# Patient Record
Sex: MALE | Race: WHITE | Employment: UNEMPLOYED | URBAN - METROPOLITAN AREA
[De-identification: names, ages, dates, MRNs, and addresses within clinical notes are randomized per-mention and may not be internally consistent; named-entity substitution may affect disease eponyms.]

---
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
## 2023-11-26 NOTE — TELEPHONE ENCOUNTER
Pt's mom calls, asking if pt is up to date on immunizations. After review no vaccines due until 16-18 years old. Discussed optional HPV vaccine series.  
no

## 2025-07-23 ENCOUNTER — HOSPITAL ENCOUNTER (EMERGENCY)
Facility: CLINIC | Age: 20
Discharge: HOME | End: 2025-07-23
Attending: EMERGENCY MEDICINE
Payer: COMMERCIAL

## 2025-07-23 VITALS
RESPIRATION RATE: 16 BRPM | WEIGHT: 178.2 LBS | SYSTOLIC BLOOD PRESSURE: 151 MMHG | HEIGHT: 70 IN | HEART RATE: 57 BPM | BODY MASS INDEX: 25.51 KG/M2 | TEMPERATURE: 98.2 F | DIASTOLIC BLOOD PRESSURE: 93 MMHG | OXYGEN SATURATION: 98 %

## 2025-07-23 DIAGNOSIS — F41.1 GAD (GENERALIZED ANXIETY DISORDER): ICD-10-CM

## 2025-07-23 DIAGNOSIS — F33.2 SEVERE EPISODE OF RECURRENT MAJOR DEPRESSIVE DISORDER, WITHOUT PSYCHOTIC FEATURES (H): Primary | ICD-10-CM

## 2025-07-23 PROBLEM — F33.1 MAJOR DEPRESSIVE DISORDER, RECURRENT EPISODE, MODERATE (H): Status: ACTIVE | Noted: 2025-07-23

## 2025-07-23 PROCEDURE — 99284 EMERGENCY DEPT VISIT MOD MDM: CPT

## 2025-07-23 PROCEDURE — 99285 EMERGENCY DEPT VISIT HI MDM: CPT | Performed by: EMERGENCY MEDICINE

## 2025-07-23 PROCEDURE — 99283 EMERGENCY DEPT VISIT LOW MDM: CPT | Performed by: EMERGENCY MEDICINE

## 2025-07-23 RX ORDER — HYDROXYZINE HYDROCHLORIDE 25 MG/1
25 TABLET, FILM COATED ORAL EVERY 8 HOURS PRN
COMMUNITY
Start: 2024-07-29

## 2025-07-23 RX ORDER — SERTRALINE HYDROCHLORIDE 100 MG/1
100 TABLET, FILM COATED ORAL DAILY
Qty: 14 TABLET | Refills: 0 | Status: SHIPPED | OUTPATIENT
Start: 2025-07-23

## 2025-07-23 ASSESSMENT — ACTIVITIES OF DAILY LIVING (ADL)
ADLS_ACUITY_SCORE: 41

## 2025-07-23 ASSESSMENT — COLUMBIA-SUICIDE SEVERITY RATING SCALE - C-SSRS
3. HAVE YOU BEEN THINKING ABOUT HOW YOU MIGHT KILL YOURSELF?: NO
6. HAVE YOU EVER DONE ANYTHING, STARTED TO DO ANYTHING, OR PREPARED TO DO ANYTHING TO END YOUR LIFE?: NO
5. HAVE YOU STARTED TO WORK OUT OR WORKED OUT THE DETAILS OF HOW TO KILL YOURSELF? DO YOU INTEND TO CARRY OUT THIS PLAN?: NO
4. HAVE YOU HAD THESE THOUGHTS AND HAD SOME INTENTION OF ACTING ON THEM?: NO

## 2025-07-23 NOTE — PROGRESS NOTES
Merrick is a 20 year old male with history of depression who present to ED looking for a mental health evaluation Patient endorsing passive SI,  fear of the future, and increase panic attack in the last few days. He reports, He has been taking Zoloft over 1 year, but he hasn't seen improvement recently. Patient is open resuming therapy, and follow up with Psychiatrist upon discharging.  Patient denies any active SI/HI. Nursing and risk assessments completed. Assessments reviewed with LMHP and physician. Admission information reviewed with patient. Patient given a tour of [facility] and instructions on using the facility. Questions regarding [facility] addressed. Pt safety search completed.

## 2025-07-23 NOTE — PROGRESS NOTES
Do you make yourself Sick because you feel uncomfortably full? No     Do you worry you have lost Control over how much you eat? No    Have you recently lost One stone (14 lbs.) in a 3-month period? No    Do you believe yourself to be Fat (overweight) when others say you are too thin? No    Would you say that Food dominates your life? No      Score: 0

## 2025-07-23 NOTE — ED PROVIDER NOTES
"  Emergency Department Note      History of Present Illness     Chief Complaint   Psychiatric Evaluation      HPI   Merrick Da Silva is a 20 year old male with history of major depressive disorder, generalized anxiety disorder and panic disorder here for a psychiatric evaluation. Patient reports having increased depression recently. He has had depression for the past 3-4 years but states he is having difficulty controlling it. He reports taking Sertraline but does not feel that it is helping. He also mentioned having passive suicidal thoughts to his parents today, who advised he come here for evaluation. He denies having any specific plan and states he would never act on it. No HI. No auditory or visual hallucinations. No history of hospitalization for mental health. He is otherwise healthy. Denies fever, chills, chest pain, shortness of breath, nausea, vomiting, abdominal pain, urinary problems and neurological abnormalities.     Independent Historian   None    Review of External Notes   Nursing telephone triage note from today.    Past Medical History     Medical History and Problem List   Generalized anxiety disorder   Panic disorder   Major depressive disorder     Medications   Sertraline     Surgical History   PE tubes  Lengthen tendon achilles     Physical Exam     Patient Vitals for the past 24 hrs:   BP Temp Temp src Pulse Resp SpO2 Height Weight   07/23/25 1335 (!) 152/100 98  F (36.7  C) Temporal 55 16 98 % 1.778 m (5' 10\") 70.3 kg (155 lb)     Physical Exam  Constitutional:       Appearance: Normal appearance.      General: Not in acute distress.  HENT:      Head: Normocephalic and atraumatic.   Eyes:      Extraocular Movements: Extraocular movements intact.      Conjunctiva/sclera: Conjunctivae normal.   Cardiovascular:      Rate and Rhythm: Normal rate and regular rhythm.   Pulmonary:      Effort: Pulmonary effort is normal. No respiratory distress.   Abdominal:      General: Abdomen is flat. There is no " distension.   Musculoskeletal:         General: No swelling or deformity.      Cervical back: Normal range of motion. No rigidity.   Skin:     Coloration: Skin is not jaundiced or pale.   Neurological:      General: No focal deficit present.      Mental Status: Alert and oriented to person, place, and time.   Psychiatric:         Mood and Affect: Mood normal.         Behavior: Behavior normal.    Diagnostics     Lab Results   Labs Ordered and Resulted from Time of ED Arrival to Time of ED Departure - No data to display    Imaging   No orders to display       Independent Interpretation   None    ED Course      Medications Administered   Medications - No data to display    Procedures   Procedures     Discussion of Management   None    ED Course   ED Course as of 07/23/25 1428   Wed Jul 23, 2025   1410 I obtained history and examined the patient as noted above.        Additional Documentation  None    Medical Decision Making / Diagnosis     CMS Diagnoses: None    MIPS   None           MDM   Merrick Da Silva is a 20 year old male as described above presents to the emergency department for depression.  Patient hemodynamically stable at time evaluation.  Afebrile.  Patient endorses having transient passive suicidal thoughts, but denies any active plan and reports that he would never act upon this.  Denies any active suicidal ideation at this time.  No homicidal ideation or hallucination.  Patient has otherwise no other medical complaints at this time to indicate need for emergent lab work imaging.  Patient is medically clear for transfer to empath unit for further mental health evaluation and assessment.  Discussed care plan with patient who voiced understanding and agreement with plan.  Answered all questions.  Additional workup and orders as listed in chart.    Please refer to ED course above as part of continuation of MDM for details on the patient's treatment course and any potential changes or updates beyond my  initial evaluation and MDM creation.    Disposition   The patient was transferred to [facility].     Diagnosis     ICD-10-CM    1. Depression, unspecified depression type  F32.A            Discharge Medications   New Prescriptions    No medications on file         Scribe Disclosure:  I, Ania Ortiz, am serving as a scribe at 2:28 PM on 7/23/2025 to document services personally performed by Eduard Roca DO based on my observations and the provider's statements to me.        Eduard Roca DO  07/23/25 9628

## 2025-07-23 NOTE — DISCHARGE INSTRUCTIONS
Upcoming Appointments    Type: Therapy - (In-Person)  Date: Friday, 7/25/2025  Time: 12:00 pm - 1:00 pm  Provider: Mary Kate CONROY,[facility]  Location: [facility], [address] [place], [place] [address]  Phone: [phone]    Patient instructions  You will receive new patient forms via email. All forms need to be completed 24 hours prior to the appointment date/time. Please call us on [phone] ext. [phone] 24 hours prior to your scheduled appointment to confirm that you plan to attend. We will provide you information about how to log into video call when you call.    *~*~*~*~*~*~*~*~*~*~*~*~*~*~*~*~*~*~*~*~*~*~*~*~*~*~*~*~*~*~*~*~*~*~*~*~*~*~*~*~*~*~*    Type: Medication Mgmt - (In-Person)  Date: Monday, 7/28/2025  Time: 11:20 am - 12:00 pm  Provider: John Basurto DNP, CNP,RN  Location: [facility], [address], [place] [address]  Phone: [phone]    Patient instructions  Please note that after your appointment is scheduled, you will receive an email with pre-appointment paperwork to complete. All forms need to be completed 24 hours prior to your appointment date/time. If you have a telehealth appointment, please ensure you have a device capable of handling video calls. [email or web address]

## 2025-07-23 NOTE — ED TRIAGE NOTES
Pt feeling more depressed and is on meds but feels they are not working the best. Pt agrees to [facility]. Pt denies SI.     Triage Assessment (Adult)       Row Name 07/23/25 9186          Triage Assessment    Airway WDL WDL        Respiratory WDL    Respiratory WDL WDL        Skin Circulation/Temperature WDL    Skin Circulation/Temperature WDL WDL        Cardiac WDL    Cardiac WDL WDL        Peripheral/Neurovascular WDL    Peripheral Neurovascular WDL WDL        Cognitive/Neuro/Behavioral WDL    Cognitive/Neuro/Behavioral WDL WDL

## 2025-07-24 NOTE — ED NOTES
This writer e-mailed [employer] to update them that pt is not under legal guardianship. The note in Epic is likely a carry-over from when pt was a minor.    Luz Elena Kaminski, LICSW

## 2025-07-24 NOTE — CONSULTS
Diagnostic Evaluation Consultation  Crisis Assessment    Patient Name: Merrick Da Silva  Age:  20 year old  Legal Sex: male  Gender Identity: male  Race: White  Ethnicity: Not  or   Language: English      Patient was assessed: In person   Crisis Assessment Start Date: 07/23/25  Crisis Assessment Start Time: 1740  Crisis Assessment Stop Time: 1810  Patient location: [facility] Emergency Dept                             [id number]    Referral Data and Chief Complaint  Merrick Da Silva presents to the ED with family/friends (with parents). Patient is presenting to the ED for the following concerns: Anxiety, Depression. Factors that make the mental health crisis life threatening or complex are: Pt presents to the ED at the recommendation of his parents for increased anxiety and depression. 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 Pt has been experiencing anxiety and depression for the last 3-4 years but has noticed a rapid increase in symptoms since graduation. Pt has been having racing, ruminating thoughts about the future and is very worried that he will never be financially stable or have a family. His anxiety has reached the point of having passive suicidal thoughts of not wanting to exist anymore, since then all of his problems would be gone. He denies actual thoughts of killing himself or a plan or intent for suicide. With further discussion, pt is able to link his heightened anxiety back to low self-esteem and feeling like he will be unable to accomplish his future goals. Pt denies concerns for KRYSTEN or HI. He is currently taking Zoloft prescribed by his PCP and feels that this has been helpful with reducing his anxiety in the mornings. He does not have established mental healthcare providers at present..      Informed Consent and Assessment Methods  Explained the crisis assessment process, including  applicable information disclosures and limits to confidentiality, assessed understanding of the process, and obtained consent to proceed with the assessment.  Assessment methods included conducting a formal interview with patient, review of medical records, collaboration with medical staff, and obtaining relevant collateral information from family and community providers when available.  : done     History of the Crisis   Pt reports a history of anxiety going back 3-4 years. He was isolated at college with limited social connections. He previously saw a therapist at school for a few sessions and found this helpful. He denies a history of NSSI or suicide attempts.    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.)  Source of Income:  other (see comments) (family support)  Financial Environmental Concerns:  none  Current Hobbies:  exercise/fitness, outdoor activities  Barriers in Personal Life:  mental health concerns, emotional concerns    Significant Clinical History  Current Anxiety Symptoms:  anxious, excessive worry, racing thoughts  Current Depression/Trauma:  sense of doom, negativistic, crying or feels like crying, low self esteem, sadness, thoughts of death/suicide (passive SI without plan or intent)  Current Somatic Symptoms:  anxious, racing thoughts, excessive worry  Current Psychosis/Thought Disturbance:     Current Eating Symptoms:     Chemical Use History:  Alcohol: Social  Last Use::  (unknown last date of use)  Benzodiazepines: None  Opiates: None  Cocaine: None  Marijuana: Occasional  Last Use:: 07/13/25  Other Use: None   Past diagnosis:  Anxiety Disorder, Depression  Family history:  No known history of mental health or chemical health concerns  Past treatment:  Individual therapy, Primary Care  Details of most recent treatment:  Pt has no established  "mental healthcare providers at present.  Other relevant history:  No other relevant history.    Have there been any medication changes in the past two weeks:  no       Is the patient compliant with medications:  yes        Collateral Information  Is there collateral information: Yes     Collateral information name, relationship, phone number:  Russell and Emeli Da Silva, parents, PH: [phone] (Russell) and PH: [phone] (Emeli)    What happened today: Pt was sad and tearful yesterday. He said that he did not want to live. He denied a suicidal plan or intent.     What is different about patient's functioning: Pt recently moved in with his parents after graduating from college. He is not finding fun in life, is avoiding social activities, and is hiding in his bedroom a lot. He did not have many social connections in college and is continuing to struggle with social isolation. Pt has had \"ups and downs\" for a few years. No HI or KRYSTEN.     What do you think the patient needs: Pt would benefit from seeing a therapist.      Has patient made comments about wanting to kill themselves/others: yes (passive SI only without plan or intent)    If d/c is recommended, can they take part in safety/aftercare planning:  yes    Additional collateral information:  No additional collateral information.     Risk Assessment  [place] Suicide Severity Rating Scale Full Clinical Version: 7/23/25  Suicidal Ideation  Q1 Wish to be Dead (Lifetime): Yes  Q2 Non-Specific Active Suicidal Thoughts (Lifetime): No  3. Active Suicidal Ideation with any Methods (Not Plan) Without Intent to Act (Lifetime): No  4. Active Suicidal Ideation with Some Intent to Act, Without Specific Plan (Lifetime): No  5. Active Suicidal Ideation with Specific Plan and Intent (Lifetime): No  Q6 Suicide Behavior (Lifetime): no  Intensity of Ideation (Lifetime)  Most Severe Ideation Rating (Lifetime): 1  Frequency (Lifetime): Many times each day  Duration (Lifetime): 1-4 " hours/a lot of time  Controllability (Lifetime): Unable to control thoughts  Deterrents (Lifetime): Deterrents definitely stopped you from attempting suicide  Reasons for Ideation (Lifetime): Completely to end or stop the pain (You couldn't go on living with the pain or how you were feeling)  Suicidal Behavior (Lifetime)  Actual Attempt (Lifetime): No  Has subject engaged in non-suicidal self-injurious behavior? (Lifetime): No  Interrupted Attempts (Lifetime): No  Aborted or Self-Interrupted Attempt (Lifetime): No  Preparatory Acts or Behavior (Lifetime): No    [place] Suicide Severity Rating Scale Recent: 7/23/25  Suicidal Ideation (Recent)  Q1 Wished to be Dead (Past Month): yes  Q2 Suicidal Thoughts (Past Month): no  Q3 Suicidal Thought Method: no  Q4 Suicidal Intent without Specific Plan: no  Q5 Suicide Intent with Specific Plan: no  Level of Risk per Screen: low risk  Intensity of Ideation (Recent)  Most Severe Ideation Rating (Past 1 Month): 1  Frequency (Past 1 Month): Many times each day  Duration (Past 1 Month): 1-4 hours/a lot of time  Controllability (Past 1 Month): Unable to control thoughts  Deterrents (Past 1 Month): Deterrents definitely stopped you from attempting suicide  Reasons for Ideation (Past 1 Month): Completely to end or stop the pain (You couldn't go on living with the pain or how you were feeling)  Suicidal Behavior (Recent)  Actual Attempt (Past 3 Months): No  Total Number of Actual Attempts (Past 3 Months): 0  Has subject engaged in non-suicidal self-injurious behavior? (Past 3 Months): No  Interrupted Attempts (Past 3 Months): No  Total Number of Interrupted Attempts (Past 3 Months): 0  Aborted or Self-Interrupted Attempt (Past 3 Months): No  Total Number of Aborted or Self-Interrupted Attempts (Past 3 Months): 0  Preparatory Acts or Behavior (Past 3 Months): No  Total Number of Preparatory Acts (Past 3 Months): 0    Environmental or Psychosocial Events: other life stressors, social  isolation  Protective Factors: Protective Factors: strong bond to family unit, community support, or employment, lives in a responsibly safe and stable environment, sense of importance of health and wellness, able to access care without barriers, supportive ongoing medical and mental health care relationships, help seeking    Does the patient have thoughts of harming others? Feels Like Hurting Others: no  Previous Attempt to Hurt Others: no  Is the patient engaging in sexually inappropriate behavior?: no  Does Patient have a known history of aggressive behavior: No  Has aggression occurred as a result of MH concerns/diagnosis: No.  Does patient have history of aggression in hospital: No.    Is the patient engaging in sexually inappropriate behavior?  no        Mental Status Exam   Affect: Blunted  Appearance: Appropriate  Attention Span/Concentration: Attentive  Eye Contact: Engaged    Fund of Knowledge: Appropriate   Language /Speech Content: Fluent  Language /Speech Volume: Normal  Language /Speech Rate/Productions: Normal  Recent Memory: Intact  Remote Memory: Intact  Mood: Anxious, Depressed, Sad  Orientation to Person: Yes   Orientation to Place: Yes  Orientation to Time of Day: Yes  Orientation to Date: Yes     Situation (Do they understand why they are here?): Yes  Psychomotor Behavior: Normal  Thought Content: Suicidal (passive SI only without plan or intent)  Thought Form: Obsessive/Perseverative     Medication  Psychotropic medications:   Medication Orders - Psychiatric (From admission, onward)      None             Current Care Team  Patient Care Team:  Alexis Haddad MD as PCP - General    Diagnosis  Patient Active Problem List   Diagnosis Code    Contracture of tendon sheath 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 Pt has been experiencing racing, ruminating thoughts leading to feeling like he does not want to exist anymore. He denies actual thoughts of wanting to kill himself or a plan or intent for suicide. Pt would like to discharge home tomorrow and is interested in referrals for therapy and psychiatry. His parents will pick him up.    Goals for crisis stabilization:  coordinate discharge    Next steps for Care Team:  coordinate discharge    Treatment Objectives Addressed:  processing feelings, assessing safety    Therapeutic Interventions:  Engaged in guided discovery, explored patient's perspectives and helped expand them through socratic dialogue.    Has a specific means been identified for suicidal/homicide actions: No    Patient coping skills attempted to reduce the crisis:  Pt has been reaching out to his parents for support.    Disposition  Recommended referrals: Individual Therapy, Medication Management        Reviewed case and recommendations with attending provider. Attending Name: Eliana Conway       Attending concurs with disposition: yes       Patient and/or validated legal guardian concurs with disposition:   yes       Final disposition:  discharge      Legal status: Voluntary/Patient has signed consent for treatment                       Reviewed court records: yes       Assessment Details   Total duration spent with the patient: 30 min     CPT code(s) utilized: 07629 - Psychotherapy for Crisis - 60 (30-74*) min    RC Lange, Psychotherapist  [facility] - Triage & Transition Services  Callback: [phone]

## 2025-07-24 NOTE — ED PROVIDER NOTES
EmPATH Unit - Psychiatric Consultation  [facility] Emergency Department    Merrick Da Silva MRN: [id number]   Age: 20 year old YOB: 2005     History     Chief Complaint   Patient presents with    Psychiatric Evaluation     HPI  Merrick Da Silva is a 20 year old male with history notable for anxiety and depression who presented to the ED with concerns for worsening depression and passive suicidal ideation further exacerbated by various psychosocial stressors. Patient reports recently completing college and moving home with additional stressors of trying to determine his next steps post college graduation. In the emergency department, this patient was determined to be medically stable and transferred to the EmPATH unit for psychiatric assessment.  Record review indicates no prior inpatient mental health hospitalizations and no prior suicide attempts. They have currently been in the emergency department for 7.5 hours.     On examination, Merrick describes steadily worsening depression and anxiety that started in May. He endorses decreased motivation, difficulty focusing, social isolation, racing thoughts, and ruminating about what his future will look like. He feels increasingly hopeless about his future. He started having passive suicidal thoughts this week, denied any specific plan or intent, and disclosed this to his parents who brought his to the ED for additional support. He denies HI/AH/VH and there's no evidence of psychosis or neetu. He denies substance use. He has been taking sertraline for approximately one year prescribed by his PCP, he perceives this to have been beneficial especially for anxiety. He is seeking additional medication management targeting depression and anxiety. Discussed additional dose optimization of sertraline which he is agreeable to. He is agreeable to referrals for therapy and psychiatric medication management. He would like to discharge following conclusion of our  "assessment.          Past Medical History  Past Medical History:   Diagnosis Date    Simple or unspecified chronic serous otitis media     s/p tubes 6/04     Past Surgical History:   Procedure Laterality Date    APPLY CAST EXTREMITY  6/6/2011    Procedure:APPLY CAST EXTREMITY; Molding Intra-Op for AFDs, Short Leg Walking Casts; Surgeon:DAVID PINK; Location:[facility] OR    LENGTHEN TENDON ACHILLES  6/6/2011    Procedure:LENGTHEN TENDON ACHILLES; Gastrocnemis-Soleus Lengthening ; Surgeon:DAVID PINK; Location:[facility] OR    MYRINGOTOMY, INSERT TUBE BILATERAL, COMBINED      ZZHC CREATE EARDRUM OPENING,GEN ANESTH  6/2004    P.E. Tubes     hydrOXYzine HCl (ATARAX) 25 MG tablet  NO ACTIVE MEDICATIONS  sertraline (ZOLOFT) 100 MG tablet      Allergies   Allergen Reactions    No Known Allergies      Family History  Family History   Problem Relation Age of Onset    Cancer Maternal Uncle         testicular    Cerebrovascular Disease Paternal Grandmother     Diabetes Paternal Grandfather     Arthritis Mother         rheumatoid     Social History   Social History     Tobacco Use    Smoking status: Never    Smokeless tobacco: Never    Tobacco comments:     No one in family smokes   Substance Use Topics    Alcohol use: No    Drug use: No          Review of Systems  A medically appropriate review of systems was performed with pertinent positives and negatives noted in the HPI, and all other systems negative.    Physical Examination   BP: (!) 152/100  Pulse: 55  Temp: 98  F (36.7  C)  Resp: 16  Height: 177.8 cm (5' 10\")  Weight: 70.3 kg (155 lb)  SpO2: 98 %    Physical Exam  General: Appears stated age.   Neuro: Alert and fully oriented. Extremities appear to demonstrate normal strength on visual inspection.   Integumentary/Skin: no rash visualized, normal color    Psychiatric Examination   Appearance: awake, alert, adequately groomed, appeared as age stated, and casually dressed  Attitude:  cooperative  Eye Contact:  " good  Mood:  depressed  Affect:  mood congruent and intensity is flat  Speech:  clear, coherent and normal prosody  Psychomotor Behavior:  no evidence of tardive dyskinesia, dystonia, or tics and intact station, gait and muscle tone  Thought Process:  logical and linear  Associations:  no loose associations  Thought Content:  no evidence of suicidal ideation or homicidal ideation and no evidence of psychotic thought  Insight:  fair  Judgement:  fair  Oriented to:  time, person, and place  Attention Span and Concentration:  intact  Recent and Remote Memory:  intact  Language: able to name/identify objects without impairment  Fund of Knowledge: intact with awareness of current and past events    ED Course     ED Course as of 07/23/25 2040 Wed Jul 23, 2025   1410 I obtained history and examined the patient as noted above.        Labs Ordered and Resulted from Time of ED Arrival to Time of ED Departure - No data to display    Assessments & Plan (with Medical Decision Making)   Patient presenting with worsening depression, anxiety, and passive suicidal ideation further exacerbated by psychosocial stressors. Patient reports recently moving home following college graduation and uncertainties about his future largely contributing to symptoms. Patient has been taking sertraline prescribed by his PCP which her perceives to be beneficial for anxiety. Treatment plan focused on additional optimization of antidepressant medication targeting depression and anxiety. Patient is interested in additional outpatient mental health supports. Nursing notes reviewed noting no acute issues.     I have reviewed the assessment completed by the [facility].     Preliminary diagnosis:    ICD-10-CM    1. Severe episode of recurrent major depressive disorder, without psychotic features (H)  F33.2       2. JUAN FRANCISCO (generalized anxiety disorder)  F41.1            Treatment Plan:  -Increase sertraline from 50mg to 100mg daily targeting depressions and  anxiety  -Medication education provided this visit including but not limited to: Rationale for medication, importance of medication adherence, medication interactions, common medication side effects, benefits of medications.  -Patient will need referral for psychiatric medication management and therapy upon discharge  -Problem focused supportive therapy and education provided today related to patient's current and acute stressors, symptoms, and diagnoses.  -Discharge from EmPATH with OP and crisis supports          -  RICHARD Dang CNP   [facility] EMERGENCY DEPT  EmPATH Unit       Eliana Conway APRN CNP  07/23/25 [id number]

## 2025-07-24 NOTE — PLAN OF CARE
Merrick Da Silva  July 23, 2025  Plan of Care Hand-off Note     Patient Recommended Care Path: discharge    Clinical Substantiation:  Pt presented to the ED at the recommendation of his parents for anxiety and depression in the context of recently graduating from college and being unsure of his next steps. Pt has been experiencing racing, ruminating thoughts leading to feeling like he does not want to exist anymore. He denies actual thoughts of wanting to kill himself or a plan or intent for suicide. Pt would like to discharge home tomorrow and is interested in referrals for therapy and psychiatry. His parents will pick him up.    Goals for crisis stabilization:  coordinate discharge    Next steps for Care Team:  coordinate discharge    Treatment Objectives Addressed:  processing feelings, assessing safety    Therapeutic Interventions:  Engaged in guided discovery, explored patient's perspectives and helped expand them through socratic dialogue.    Has a specific means been identified for suicidal.homicide actions: No    Patient coping skills attempted to reduce the crisis:  Pt has been reaching out to his parents for support.    Collateral contact information:  Russell and Emeli Da Silva, parents, PH: [phone] (Russell) and PH: [phone] (Emeli)    Legal Status: Voluntary/Patient has signed consent for treatment                                                  Reviewed court records: yes     Psychiatry Consult: No psychiatry consult needed. Pt is at [facility].    RC Lange

## 2025-08-02 ENCOUNTER — HEALTH MAINTENANCE LETTER (OUTPATIENT)
Age: 20
End: 2025-08-02

## 2025-08-11 ENCOUNTER — DOCUMENTATION ONLY (OUTPATIENT)
Dept: OTHER | Facility: CLINIC | Age: 20
End: 2025-08-11
Payer: COMMERCIAL